# Patient Record
(demographics unavailable — no encounter records)

---

## 2025-06-25 NOTE — HISTORY OF PRESENT ILLNESS
[de-identified] : Age: 16 year M PMHx: none Hand Dominance: RHD Chief Complaint: Left small finger pain s/p trauma 04/02/25. Patient reports that he was catching a rubber ball in gym class when he had jammed his left small finger, causing his injury. Patient was seen at [facility] 04/02/25 where he had radiographs performed that confirmed a fracture in the small finger. Patient was given an alumafoam finger splint which he wore until late April 2025. Patient states that he cannot fully curl his left small finger towards his palm. Patient also notes a dull ache when he tries to make a fist. Denies numbness/tingling.  Trauma: 04/02/25 Outside Imaging/Treatment: radiographs from [facility] 04/02/25 OTC Medications: Naproxen PRN initially with relief OT/PT: none Bracing: finger placed in splint initially, but has  Pain worse with: movement Pain better with:  ***Accompanied by father***

## 2025-06-25 NOTE — ASSESSMENT
[FreeTextEntry1] : EXAM Left small finger with mild swelling, skin intact. +ttp at P1. Able to flex and extend at MCP, PIP and DIP with no rotational deformity - 10 degree PIPj flexion limitation. Sensation intact at radial and ulnar pulp. <2sec cap refill.  Left small finger radiographs with proximal phalanx fracture, oblique, shortened. (3-view)  ASSESSMENT/PLAN Left small finger proximal phalanx fracture - reviewed radiographs and pathoanatomy with patient/family. Discussed the current alignment both radiographically and clinically are within acceptable parameters to manage nonoperatively. ROM permitted. Elevate, minimize use. OT for ROM.  Acute complicated injury - Risk of pain, stiffness, malunion, nonunion, rotational malalignment, post-traumatic arthrosis, and displacement requiring further intervention.  F/u 6week; reassess

## 2025-06-25 NOTE — HISTORY OF PRESENT ILLNESS
[de-identified] : Age: 16 year M PMHx: none Hand Dominance: RHD Chief Complaint: Left small finger pain s/p trauma 04/02/25. Patient reports that he was catching a rubber ball in gym class when he had jammed his left small finger, causing his injury. Patient was seen at [facility] 04/02/25 where he had radiographs performed that confirmed a fracture in the small finger. Patient was given an alumafoam finger splint which he wore until late April 2025. Patient states that he cannot fully curl his left small finger towards his palm. Patient also notes a dull ache when he tries to make a fist. Denies numbness/tingling.  Trauma: 04/02/25 Outside Imaging/Treatment: radiographs from [facility] 04/02/25 OTC Medications: Naproxen PRN initially with relief OT/PT: none Bracing: finger placed in splint initially, but has  Pain worse with: movement Pain better with:  ***Accompanied by father***